# Patient Record
(demographics unavailable — no encounter records)

---

## 2024-12-06 NOTE — ASSESSMENT
[FreeTextEntry1] : Impression: Cervical radiculitis and lumbar radiculitis.  This patient will be treated with a course of diclofenac and tizanidine.  She will return if she is not progressing.

## 2024-12-06 NOTE — PHYSICAL EXAM
[de-identified] : Examination today reveals a straight spine and a normal gait she has good motion to the lumbar spine with only mild spasm noted no trigger points the posterior pelvic wall is nontender.  The cervical region reveals restricted motion and rotation to the right and in flexion.  There is spasm more so on the left side of the subaxial region extending into the trapezius on the left no trigger points present.  She has good motion to both upper extremities.  She is neurologically intact.  X-rays ordered and taken today of the cervical spine reveal no significant abnormality

## 2024-12-06 NOTE — HISTORY OF PRESENT ILLNESS
[de-identified] : This 38-year-old returns for reevaluation of neck and back pain.  This has been recent onset in the neck with no obvious traumatic or precipitating event.  Her back has been somewhat chronic but low-grade discomfort for which she has been tolerating well.  Her neck pain radiates into the trapezius on both sides with no numbness or paresthesias.  Overall she continues to function well.  Her general health has been good since she was last seen.

## 2024-12-20 NOTE — ASSESSMENT
[FreeTextEntry1] : Impression: Lumbar radiculitis/myofascial pain.  I have injected the trigger point she has been placed on sulindac she is not interested in therapy she will return on a as needed basis

## 2024-12-20 NOTE — PHYSICAL EXAM
[de-identified] : Exam reveals normal stance and gait she has reasonable motion to the lumbar spine she has spasm and tenderness on the left side with a trigger point in the lumbar musculature.  There are no tension signs present she is neurologically intact

## 2024-12-20 NOTE — HISTORY OF PRESENT ILLNESS
[de-identified] : This 38-year-old returns with recurrent back pain more so left-sided does not radiate distally no numbness or paresthesias.